# Patient Record
(demographics unavailable — no encounter records)

---

## 2024-10-10 NOTE — HISTORY OF PRESENT ILLNESS
[FreeTextEntry1] : Mohs surgery consult for BCC nodular type of the left nasal dorsum  [de-identified] : 10/09/2024     Referred by: Dr. Rubin   We had the pleasure of seeing your patient in consultation for Mohs Micrographic Surgery.    Mr. ISIAH KC is a 85 year old M who presents for consultation for Mohs Micrographic Surgery of BCC nodular type of the left nasal dorsum. He noticed a small pimple in May that wouldn't go away, he picked at it multiple times with bleeding but it recurred. Biopsy on 8/15 showed BCC nodular type extending to the base of the specimen.   Skin cancer history:  melanoma left arm , 1.5mm depth, no ulceration, negatlve SLNBx PMH: HTN, AFib s/p ablation   SH: Here with his son, Isiah. Lives in [place] with both his sons and his grandchildren, 11 and 12 yo. His daughter  at age 42 of melanoma. His wife  2 months before her due to a cancer affecting the bones in her spine.  Retired, formerly was a commercial window washer Upcoming travel plans: none  Pertinent positives noted below   History of immunosuppression, radiation, or cancer: no No History of HIV or hepatitis: no No Blood thinners: ELIQUIS Antibiotic Prophylaxis: None   Medical implants: None    The patient's review of systems questionnaire was reviewed.   Education needs were identified. There were no barriers to learning.

## 2024-10-10 NOTE — ASSESSMENT
[External notes review: [ enter provider(s) name(s) ] :____] : As part of my evaluation, I have reviewed prior clinical note(s) from provider(s) outside of my group practice. The name(s) are: [unfilled] [FreeTextEntry1] : Mohs surgery consultation for BCC nodular type of the left nasal dorsum   -- I explained the treatment options of topical immunomodulatory or chemotherapy, electrodessication and curettage, radiation therapy, excision and Mohs surgery.  I recommended Mohs surgery due to the tumor type, location, and ill-defined nature of cancer. I explained that following extirpation there will be a full thickness defect of the involved area. The reconstructive options will be based on the defect size and surrounding tissue laxity of the involved area. Primary closure is only possible for smaller defects. For larger defects, local tissue rearrangement or skin grafting may be necessary. Risks following layered primary closure or local tissue rearrangement include wound dehiscence, contour irregularity, bleeding, infection, and paresthesia (nerve damage including sensory deficit or motor weakness). Risks following skin grafting include wound dehiscence, skin graft nonadherence (partial or complete), contour irregularity, bleeding, infection, paresthesia, and donor site complications. I explained that the patient will need to abstain from physical activity for 1-2 weeks following the surgery, that they would heal with a scar, and also discussed the chances of infection, bleeding, potential sensory or motor nerve damage, and recurrence.  The patient indicated that s/he understood the risks and wished to schedule the procedure.   -- In particular, for reconstruction we discussed flap repair.   Thank you for this Mohs surgery referral. We recommended that Mr. ELENA KC follow up with His referring dermatologist for routine skin exams following surgery.     Yuko العلي MD, [facility]  [facility] Derm[facility] Director of Dermatologic Surgery [facility]

## 2024-10-10 NOTE — PHYSICAL EXAM
[Alert] : alert [Oriented x 3] : ~L oriented x 3 [Well Nourished] : well nourished [Conjunctiva Non-injected] : conjunctiva non-injected [No Visual Lymphadenopathy] : no visual  lymphadenopathy [No Clubbing] : no clubbing [No Edema] : no edema [No Bromhidrosis] : no bromhidrosis [No Chromhidrosis] : no chromhidrosis [FreeTextEntry3] : A focused skin examination was performed per patient preference, and the following findings were noted:  1.0 x 1.0cm pink macule on left nasal dorsum

## 2024-11-24 NOTE — ASSESSMENT
[External notes review: [ enter provider(s) name(s) ] :____] : As part of my evaluation, I have reviewed prior clinical note(s) from provider(s) outside of my group practice. The name(s) are: [unfilled]

## 2024-11-25 NOTE — HISTORY OF PRESENT ILLNESS
[FreeTextEntry1] : Mohs surgery for BCC nodular type of the left nasal dorsum  [de-identified] : 2024  Referred by: Dr. Rubin   We had the pleasure of seeing your patient for Mohs Micrographic Surgery.    Mr. ISIAH KC is a 85 year old M who presents for Mohs Micrographic Surgery of BCC nodular type of the left nasal dorsum.  Consult done 10/9/2024 He noticed a small pimple in May that wouldn't go away, he picked at it multiple times with bleeding but it recurred. Biopsy on 8/15 showed BCC nodular type extending to the base of the specimen.   Skin cancer history:  melanoma left arm , 1.5mm depth, no ulceration, negatlve SLNBx PMH: HTN, AFib s/p ablation   SH: Here with his son, Isiah. Lives in [place] with both his sons and his grandchildren, 11 and 14 yo. His daughter  at age 42 of melanoma. His wife  2 months before her due to a cancer affecting the bones in her spine.  Retired, formerly was a commercial window washer Upcoming travel plans: none  Pertinent positives noted below   History of immunosuppression, radiation, or cancer: no No History of HIV or hepatitis: no No Blood thinners: ELIQUIS Antibiotic Prophylaxis: None   Medical implants: None    The patient's review of systems questionnaire was reviewed.   Education needs were identified. There were no barriers to learning.

## 2024-11-25 NOTE — HISTORY OF PRESENT ILLNESS
[FreeTextEntry1] : Mohs surgery for BCC nodular type of the left nasal dorsum  [de-identified] : 2024  Referred by: Dr. Rubin   We had the pleasure of seeing your patient for Mohs Micrographic Surgery.    Mr. ISIAH KC is a 85 year old M who presents for Mohs Micrographic Surgery of BCC nodular type of the left nasal dorsum.  Consult done 10/9/2024 He noticed a small pimple in May that wouldn't go away, he picked at it multiple times with bleeding but it recurred. Biopsy on 8/15 showed BCC nodular type extending to the base of the specimen.   Skin cancer history:  melanoma left arm , 1.5mm depth, no ulceration, negatlve SLNBx PMH: HTN, AFib s/p ablation   SH: Here with his son, Isiah. Lives in [place] with both his sons and his grandchildren, 11 and 14 yo. His daughter  at age 42 of melanoma. His wife  2 months before her due to a cancer affecting the bones in her spine.  Retired, formerly was a commercial window washer Upcoming travel plans: none  Pertinent positives noted below   History of immunosuppression, radiation, or cancer: no No History of HIV or hepatitis: no No Blood thinners: ELIQUIS Antibiotic Prophylaxis: None   Medical implants: None    The patient's review of systems questionnaire was reviewed.   Education needs were identified. There were no barriers to learning.

## 2024-11-25 NOTE — PHYSICAL EXAM
[Alert] : alert [Oriented x 3] : ~L oriented x 3 [Well Nourished] : well nourished [Conjunctiva Non-injected] : conjunctiva non-injected [No Visual Lymphadenopathy] : no visual  lymphadenopathy [No Clubbing] : no clubbing [No Edema] : no edema [No Bromhidrosis] : no bromhidrosis [No Chromhidrosis] : no chromhidrosis [FreeTextEntry3] : 0.9x0.8 cm pink macule on left nasal dorsum

## 2024-11-25 NOTE — PHYSICAL EXAM
[Alert] : alert [Oriented x 3] : ~L oriented x 3 [Well Nourished] : well nourished [Conjunctiva Non-injected] : conjunctiva non-injected [No Visual Lymphadenopathy] : no visual  lymphadenopathy [No Clubbing] : no clubbing [No Edema] : no edema [No Chromhidrosis] : no chromhidrosis [No Bromhidrosis] : no bromhidrosis [FreeTextEntry3] : 0.9x0.8 cm pink macule on left nasal dorsum